# Patient Record
Sex: FEMALE | Race: AMERICAN INDIAN OR ALASKA NATIVE
[De-identification: names, ages, dates, MRNs, and addresses within clinical notes are randomized per-mention and may not be internally consistent; named-entity substitution may affect disease eponyms.]

---

## 2020-01-28 ENCOUNTER — HOSPITAL ENCOUNTER (EMERGENCY)
Dept: HOSPITAL 5 - ED | Age: 60
LOS: 1 days | Discharge: HOME | End: 2020-01-29
Payer: SELF-PAY

## 2020-01-28 DIAGNOSIS — E11.65: Primary | ICD-10-CM

## 2020-01-28 LAB
ALBUMIN SERPL-MCNC: 4.4 G/DL (ref 3.9–5)
ALT SERPL-CCNC: 22 UNITS/L (ref 7–56)
BASOPHILS # (AUTO): 0 K/MM3 (ref 0–0.1)
BASOPHILS NFR BLD AUTO: 0.4 % (ref 0–1.8)
BUN SERPL-MCNC: 16 MG/DL (ref 7–17)
BUN/CREAT SERPL: 16 %
CALCIUM SERPL-MCNC: 9.5 MG/DL (ref 8.4–10.2)
EOSINOPHIL # BLD AUTO: 0 K/MM3 (ref 0–0.4)
EOSINOPHIL NFR BLD AUTO: 0.4 % (ref 0–4.3)
HCT VFR BLD CALC: 40.1 % (ref 30.3–42.9)
HEMOLYSIS INDEX: 26
HGB BLD-MCNC: 13.1 GM/DL (ref 10.1–14.3)
LYMPHOCYTES # BLD AUTO: 1.3 K/MM3 (ref 1.2–5.4)
LYMPHOCYTES NFR BLD AUTO: 21.9 % (ref 13.4–35)
MCHC RBC AUTO-ENTMCNC: 33 % (ref 30–34)
MCV RBC AUTO: 85 FL (ref 79–97)
MONOCYTES # (AUTO): 0.4 K/MM3 (ref 0–0.8)
MONOCYTES % (AUTO): 7 % (ref 0–7.3)
PLATELET # BLD: 260 K/MM3 (ref 140–440)
RBC # BLD AUTO: 4.73 M/MM3 (ref 3.65–5.03)

## 2020-01-28 PROCEDURE — 99283 EMERGENCY DEPT VISIT LOW MDM: CPT

## 2020-01-28 PROCEDURE — 82805 BLOOD GASES W/O2 SATURATION: CPT

## 2020-01-28 PROCEDURE — 83735 ASSAY OF MAGNESIUM: CPT

## 2020-01-28 PROCEDURE — 93010 ELECTROCARDIOGRAM REPORT: CPT

## 2020-01-28 PROCEDURE — 96374 THER/PROPH/DIAG INJ IV PUSH: CPT

## 2020-01-28 PROCEDURE — 96361 HYDRATE IV INFUSION ADD-ON: CPT

## 2020-01-28 PROCEDURE — 82962 GLUCOSE BLOOD TEST: CPT

## 2020-01-28 PROCEDURE — 36415 COLL VENOUS BLD VENIPUNCTURE: CPT

## 2020-01-28 PROCEDURE — 82550 ASSAY OF CK (CPK): CPT

## 2020-01-28 PROCEDURE — 93005 ELECTROCARDIOGRAM TRACING: CPT

## 2020-01-28 PROCEDURE — 85025 COMPLETE CBC W/AUTO DIFF WBC: CPT

## 2020-01-28 PROCEDURE — 80053 COMPREHEN METABOLIC PANEL: CPT

## 2020-01-28 NOTE — EVENT NOTE
ED Screening Note


Date of service: 01/28/20


Time: 17:38


ED Screening Note: 


59 y o f presents for hyperglycemia with pmh of DM








This initial assessment/diagnostic orders/clinical plan/treatment(s) is/are 

subject to change based on patients health status, clinical progression and re-

assessment by fellow clinical providers in the ED. Further treatment and workup 

at subsequent clinical providers discretion. Patient/guardian urged not to elope

from the ED as their condition may be serious if not clinically assessed and 

managed. 





Initial orders include: 


labs,main side eval

## 2020-01-28 NOTE — EMERGENCY DEPARTMENT REPORT
ED General Adult HPI





- General


Chief complaint: Hyperglycemia


Stated complaint: GLUCOSE HIGH


Time Seen by Provider: 01/28/20 23:14


Source: patient, RN notes reviewed


Mode of arrival: Ambulatory


Limitations: No Limitations





- History of Present Illness


Initial comments: 





During the history and physical examination, I am chaperone and escorted by 

paramedic ALHAJI Meza











The patient is a 59-year-old female.  The patient is not known to myself 

previously.  She may have a history of diabetes but does not take any 

prescription medications.  The patient does not have a local primary care 

doctor.  The patient presents to the ER today with complaint of painless 

generalized weakness, sensation of having high blood sugar, polyuria, polydipsia

and polyphagia.  She is not having physical pain at this time.  Her symptoms are

constant, do not radiate anywhere, and do not have exacerbating or relieving 

factors.  The patient denies physical pain at this time.  She denies loss of 

vision.  She only indicates that her vision is "blurry."  She also describes a 

sensation of dizziness, which is not described as loss of consciousness, or 

ataxia.  This is present for the past few weeks to few months.








-: Gradual


Quality: other


Consistency: other


Improves with: other


Worsens with: other





- Related Data


                                    Allergies











Allergy/AdvReac Type Severity Reaction Status Date / Time


 


No Known Allergies Allergy   Unverified 01/28/20 17:25














ED Review of Systems


ROS: 


Stated complaint: GLUCOSE HIGH


Other details as noted in HPI





Constitutional: malaise.  denies: fever


Eyes: denies: eye pain, eye discharge


ENT: denies: congestion


Respiratory: denies: wheezing


Cardiovascular: denies: chest pain, syncope


Gastrointestinal: denies: abdominal pain


Genitourinary: denies: dysuria


Musculoskeletal: denies: back pain


Neurological: weakness


Hematological/Lymphatic: denies: easy bleeding





ED Past Medical Hx





- Past Medical History


Hx Diabetes: Yes





- Surgical History


Past Surgical History?: No





- Social History


Smoking Status: Never Smoker


Substance Use Type: Alcohol





ED Physical Exam





- General


Limitations: No Limitations


General appearance: alert, in no apparent distress





- Head


Head exam: Present: atraumatic, normocephalic





- Eye


Eye exam: Present: normal appearance, PERRL, EOMI, other (visual acuity intact 

to finger counting, color perception and reading at a close distance).  Absent: 

nystagmus





- ENT


ENT exam: Present: normal exam, mucous membranes moist, normal external ear exam





- Neck


Neck exam: Present: normal inspection, full ROM.  Absent: tenderness, 

meningismus





- Respiratory


Respiratory exam: Present: normal lung sounds bilaterally.  Absent: respiratory 

distress





- Cardiovascular


Cardiovascular Exam: Present: normal rhythm, tachycardia, normal heart sounds.  

Absent: systolic murmur, diastolic murmur, rubs, gallop





- GI/Abdominal


GI/Abdominal exam: Present: soft.  Absent: distended, tenderness, guarding, 

rebound, rigid, pulsatile mass





- Extremities Exam


Extremities exam: Present: normal inspection, full ROM, other (2+ pulses noted 

in the bilateral upper and lower extremities.  There is no long bony tenderness.

 The pelvis is stable.  The muscular compartments are soft.  There is no 

palpable cord.  There is no redness, pus or streaking.).  Absent: pedal edema, 

calf tenderness





- Back Exam


Back exam: Present: normal inspection, full ROM.  Absent: tenderness, CVA 

tenderness (R), CVA tenderness (L), paraspinal tenderness, vertebral tenderness





- Neurological Exam


Neurological exam: Present: alert, normal gait, other (2+ pulses noted in the 

bilateral upper and lower extremities.  There is no long bony tenderness.  The 

pelvis is stable.  The muscular compartments are soft.  There is no palpable 

cord.  There is no redness, pus or streaking.).  Absent: motor sensory deficit





- Psychiatric


Psychiatric exam: Present: anxious





- Skin


Skin exam: Present: warm, dry, intact, normal color.  Absent: rash





ED Course


                                   Vital Signs











  01/28/20 01/28/20 01/28/20





  17:26 22:38 22:45


 


Temperature 98.1 F  


 


Pulse Rate 116 H 98 H 


 


Respiratory 18 12 18





Rate   


 


Blood Pressure 165/93  


 


O2 Sat by Pulse 97 98 100





Oximetry   














  01/28/20 01/28/20 01/28/20





  22:46 23:00 23:16


 


Temperature   


 


Pulse Rate 94 H 95 H 100 H


 


Respiratory 13 13 18





Rate   


 


Blood Pressure 158/98 148/93 148/93


 


O2 Sat by Pulse 97 99 98





Oximetry   














  01/28/20 01/28/20





  23:30 23:46


 


Temperature  


 


Pulse Rate 93 H 95 H


 


Respiratory 12 12





Rate  


 


Blood Pressure 144/90 148/93


 


O2 Sat by Pulse  99





Oximetry  














- Reevaluation(s)


Reevaluation #1: 





01/29/20 00:35


Repeat Accu-Chek is 249.  Vital signs remain stable.  Tachycardia resolved.  

Patient in no acute distress.  Patient will need to follow up with an outpatient

primary care doctor, and initiate diet and lifestyle modifications.





ED Medical Decision Making





- Lab Data


Result diagrams: 


                                 01/28/20 17:55





                                 01/28/20 17:55








                                   Vital Signs











  01/28/20 01/28/20 01/28/20





  17:26 22:38 22:45


 


Temperature 98.1 F  


 


Pulse Rate 116 H 98 H 


 


Respiratory 18 12 18





Rate   


 


Blood Pressure 165/93  


 


O2 Sat by Pulse 97 98 100





Oximetry   














  01/28/20 01/28/20 01/28/20





  22:46 23:00 23:16


 


Temperature   


 


Pulse Rate 94 H 95 H 100 H


 


Respiratory 13 13 18





Rate   


 


Blood Pressure 158/98 148/93 148/93


 


O2 Sat by Pulse 97 99 98





Oximetry   














  01/28/20 01/28/20 01/28/20





  23:21 23:30 23:35


 


Temperature   


 


Pulse Rate 99 H 93 H 93 H


 


Respiratory 12 12 11 L





Rate   


 


Blood Pressure 148/93 144/90 144/90


 


O2 Sat by Pulse 98  98





Oximetry   














  01/28/20 01/28/20 01/29/20





  23:46 23:47 00:00


 


Temperature   


 


Pulse Rate 95 H 95 H 96 H


 


Respiratory 12 14 13





Rate   


 


Blood Pressure 148/93 144/90 131/80


 


O2 Sat by Pulse 99 98 97





Oximetry   














  01/29/20





  00:16


 


Temperature 


 


Pulse Rate 98 H


 


Respiratory 14





Rate 


 


Blood Pressure 144/90


 


O2 Sat by Pulse 97





Oximetry 














- EKG Data


-: EKG Interpreted by Me


EKG shows normal: sinus rhythm


Rate: tachycardia





- EKG Data





01/29/20 00:30











There is no prior EKG available for comparison.  The EKG shows a sinus 

tachycardia, left ventricular hypertrophy, QTC within normal limits, nonspecific

ST abnormality, no prior for comparison, motion artifact, there is no 

endorsement of chest pain, the EKG is abnormal, not consistent with ST elevation

myocardial infarction.




















- Medical Decision Making





Differential diagnosis, including not limited to: Diabetes, hyperglycemia, 

dehydration





Assessment and plan: 59-year-old female with likely expected history of 

uncontrolled hyperglycemia.  Her laboratory studies show hyperglycemia, 

borderline pseudohyponatremia, she does not have an anion gap acidosis, and 

therefore does not meet criteria for hospitalization for insulin drip.  She will

be given fluids and high-dose insulin here in the emergency room.  We had an 

extensive discussion about the need for diet and lifestyle modifications.  In 

addition, the patient will be referred to local outpatient primary care.





In addition, extensive discussions were had with the patient regarding need to 

educate herself on outpatient diabetic resources.  We specifically talked about 

the American diabetes Association website, and useful information and lifestyle 

modification guidelines that it provides.


Critical Care Time: Yes


Critical care time in (mins) excluding proc time.: 35


Critical care attestation.: 


If time is entered above; I have spent that time in minutes in the direct care 

of this critically ill patient, excluding procedure time.








ED Disposition


Clinical Impression: 


 Hyperglycemia





Disposition: DC-01 TO HOME OR SELFCARE


Is pt being admited?: No


Does the pt Need Aspirin: No


Condition: Stable


Additional Instructions: 


Patient was found to have high blood sugar today in the emergency room, which is

likely a function of uncontrolled diabetes mellitus type 2.  We will recommend 

the patient drink 4-6 cups of water per day indefinitely, avoid consumption of 

simple carbohydrates, sugar, starches.  Recommend the patient consume plenty of 

fiber, vegetables, and lean protein.  Recommend the patient participate in 

physical activities and exercise as tolerated.  Patient may reference the 

American diabetes Association website, with [place] Government FDA food 

pyramid for specific information and guidelines and what she should and should 

not eat, how much to eat and how often to eat.  We recommend that the patient 

follow up with a primary care doctor for high blood sugar and presumed type 2 

diabetes within the next 2-3 weeks.  It is very important that the patient make 

lifestyle changes to improve her blood sugar levels.  Long-term complications of

high blood sugar include stroke, heart attack, blindness, disability, death, 

paralysis, loss of quality of life.








Please return to the emergency room Runaway with new, worsening or different 

symptoms, or symptoms not present on initial emergency room evaluation.


Referrals: 


DECLAN PADILLA MD [Staff Physician] - 3-5 Days


[facility] [Provider Group] - 3-5 Days


[facility] [Provider Group] - 3-5 Days

## 2020-01-29 VITALS — DIASTOLIC BLOOD PRESSURE: 83 MMHG | SYSTOLIC BLOOD PRESSURE: 135 MMHG
